# Patient Record
Sex: FEMALE | Employment: FULL TIME | URBAN - METROPOLITAN AREA
[De-identification: names, ages, dates, MRNs, and addresses within clinical notes are randomized per-mention and may not be internally consistent; named-entity substitution may affect disease eponyms.]

---

## 2020-11-24 VITALS
SYSTOLIC BLOOD PRESSURE: 100 MMHG | DIASTOLIC BLOOD PRESSURE: 68 MMHG | BODY MASS INDEX: 32.85 KG/M2 | WEIGHT: 174 LBS | HEIGHT: 61 IN

## 2020-11-24 PROBLEM — F41.9 ANXIETY: Status: ACTIVE | Noted: 2020-11-24

## 2020-11-24 PROBLEM — F32.A DEPRESSIVE DISORDER: Status: ACTIVE | Noted: 2020-11-24

## 2020-11-24 RX ORDER — NORELGESTROMIN AND ETHINYL ESTRADIOL 150; 35 UG/D; UG/D
1 PATCH TRANSDERMAL
COMMUNITY
End: 2020-11-30

## 2020-11-24 RX ORDER — NORGESTIMATE AND ETHINYL ESTRADIOL 0.25-0.035
1 KIT ORAL DAILY
COMMUNITY
End: 2020-11-30

## 2020-11-30 ENCOUNTER — OFFICE VISIT (OUTPATIENT)
Dept: OBGYN CLINIC | Age: 30
End: 2020-11-30
Payer: MEDICARE

## 2020-11-30 VITALS
BODY MASS INDEX: 32.66 KG/M2 | SYSTOLIC BLOOD PRESSURE: 108 MMHG | HEIGHT: 61 IN | WEIGHT: 173 LBS | DIASTOLIC BLOOD PRESSURE: 60 MMHG

## 2020-11-30 DIAGNOSIS — Z01.419 ENCOUNTER FOR GYNECOLOGICAL EXAMINATION WITHOUT ABNORMAL FINDING: Primary | ICD-10-CM

## 2020-11-30 DIAGNOSIS — Z12.39 ENCOUNTER FOR BREAST CANCER SCREENING USING NON-MAMMOGRAM MODALITY: ICD-10-CM

## 2020-11-30 DIAGNOSIS — Z11.3 VENEREAL DISEASE SCREENING: ICD-10-CM

## 2020-11-30 DIAGNOSIS — N94.6 DYSMENORRHEA: ICD-10-CM

## 2020-11-30 DIAGNOSIS — R10.31 RIGHT LOWER QUADRANT PAIN: ICD-10-CM

## 2020-11-30 PROBLEM — R51.9 CHRONIC HEADACHES: Status: ACTIVE | Noted: 2020-11-30

## 2020-11-30 PROBLEM — G89.29 CHRONIC HEADACHES: Status: ACTIVE | Noted: 2020-11-30

## 2020-11-30 PROCEDURE — 99395 PREV VISIT EST AGE 18-39: CPT | Performed by: OBSTETRICS & GYNECOLOGY

## 2020-11-30 PROCEDURE — G9717 DOC PT DX DEP/BP F/U NT REQ: HCPCS | Performed by: OBSTETRICS & GYNECOLOGY

## 2020-11-30 PROCEDURE — G8417 CALC BMI ABV UP PARAM F/U: HCPCS | Performed by: OBSTETRICS & GYNECOLOGY

## 2020-11-30 RX ORDER — ATENOLOL 25 MG/1
TABLET ORAL
COMMUNITY

## 2020-11-30 RX ORDER — ETONOGESTREL AND ETHINYL ESTRADIOL 11.7; 2.7 MG/1; MG/1
INSERT, EXTENDED RELEASE VAGINAL
Qty: 1 DEVICE | Refills: 11 | Status: SHIPPED | OUTPATIENT
Start: 2020-11-30 | End: 2021-12-06

## 2020-11-30 RX ORDER — BUSPIRONE HYDROCHLORIDE 5 MG/1
TABLET ORAL
COMMUNITY

## 2020-11-30 NOTE — PROGRESS NOTES
HPI: Jeffrey Adamson is a 35 y.o. female [address], LMP 11/9/2020, who presents today for the following:  Chief Complaint   Patient presents with   Hassel Titus Exam        She denies vaginal/vulvar pruritus; abnormal vaginal discharge or vaginal odor. She does have dysmenorrhea. She was considering IUD but is hesitant due to stories of uterine perforation. Last pap smear in May 2019 was neg. Past Medical History:   Diagnosis Date    Anxiety disorder     Chronic back pain     Chronic headaches     Depression     HPV (human papilloma virus) anogenital infection        History reviewed. No pertinent surgical history. Family History   Problem Relation Age of Onset    Diabetes Father     Breast Cancer Maternal Grandmother 67    Diabetes Maternal Grandmother     Diabetes Maternal Grandfather     Diabetes Paternal Grandmother     Uterine Cancer Neg Hx     Colon Cancer Neg Hx     Ovarian Cancer Neg Hx        Social History     Socioeconomic History    Marital status:      Spouse name: Not on file    Number of children: 0    Years of education: Not on file    Highest education level:  Bachelor's degree (e.g., BA, AB, BS)   Occupational History    Occupation:    Social Needs    Financial resource strain: Not on file    Food insecurity     Worry: Not on file     Inability: Not on file   Spyder Lynk needs     Medical: Not on file     Non-medical: Not on file   Tobacco Use    Smoking status: Current Every Day Smoker     Packs/day: 0.25     Types: Cigarettes    Smokeless tobacco: Never Used    Tobacco comment: 1 PPW   Substance and Sexual Activity    Alcohol use: Yes     Comment: Occasional    Drug use: Never    Sexual activity: Yes     Partners: Male     Birth control/protection: None     Comment: h/o HPV; ?gonorrhea   Lifestyle    Physical activity     Days per week: 5 days     Minutes per session: 60 min    Stress: Not on file   Relationships    Social connections Talks on phone: Not on file     Gets together: Not on file     Attends Gnosticism service: Not on file     Active member of club or organization: Not on file     Attends meetings of clubs or organizations: Not on file     Relationship status: Not on file    Intimate partner violence     Fear of current or ex partner: Not on file     Emotionally abused: Not on file     Physically abused: Not on file     Forced sexual activity: Not on file   Other Topics Concern    Not on file   Social History Narrative    Not on file         Review of Systems: Denies issues with eyes, ears, mouth, nose. Denies fevers/chills, significant weight loss/gain. Denies chest pain, shortness of breath, nausea, vomiting, constipation, diarrhea. Chronic RLQ, intermittent, x 3-4 yrs. Denies dysuria. Denies weakness, numbness or tingling. Chronic back pain and b/l shoulder pain. Denies issues with breasts. Denies bleeding/clotting d/o's. + Anxiety/depression. Denies S/HI. Feels safe at home. OBJECTIVE:  /60 (BP 1 Location: Right arm, BP Patient Position: Sitting)   Ht 5' 1\" (1.549 m)   Wt 173 lb (78.5 kg)   LMP 11/09/2020 (Approximate)   BMI 32.69 kg/m²      Constitutional  · Appearance: well-nourished, well developed, alert, in no acute distress    HENT  · Head and Face: appears normal    Neck  · Inspection/Palpation: normal appearance      Breasts   Symmetric, no palpable masses, no tenderness, no skin changes, no nipple abnormality, no nipple discharge, no axillary or supraclavicular lymphadenopathy.     Chest  · Respiratory Effort: normal      Gastrointestinal  · Abdominal Examination: abdomen non-tender to palpation, no masses present  · Liver and spleen: no hepatomegaly present, spleen not palpable      Genitourinary  · External Genitalia: normal appearance for age, no discharge present, no tenderness present, no inflammatory lesions present, no masses present, no atrophy present  · Vagina: normal vaginal vault without central or paravaginal defects, no discharge present, no inflammatory lesions present, no masses present  · Bladder: non-tender to palpation  · Urethra: appears normal  · Cervix: normal, no cervical motion tenderness or abnormal discharge; swab obtained  · Uterus: normal size, shape and consistency  · Adnexa: no adnexal tenderness present, no adnexal masses present  · Perineum: perineum within normal limits, no evidence of trauma, no rashes or skin lesions present  · Anus: anus within normal limits, no hemorrhoids present    Skin  · General Inspection: no rash, no lesions identified    Neurologic/Psychiatric  · Mental Status:  · Orientation: grossly oriented to person, place and time  · Mood and Affect: mood normal, affect appropriate      Assessment/plan:    ICD-10-CM ICD-9-CM    1. Encounter for gynecological examination without abnormal finding  Z01.419 V72.31    2. Dysmenorrhea  N94.6 625.3 ethinyl estradiol-etonogestrel (NUVARING) 0.12-0.015 mg/24 hr vaginal ring   3. Right lower quadrant pain  R10.31 789.03 US PELV NON OBS   4. Venereal disease screening  Z11.3 V74.5 CT/NG/T.VAGINALIS AMPLIFICATION   5. Encounter for breast cancer screening using non-mammogram modality  Z12.39 V76.10        -Annual gynecologic exam.    -Cervical cancer screening- pap smear neg May 2019. Due in 2022. -Breast cancer screening- breast awareness discussed; mammograms beginning at age 36.    -STI screening-accepts testing: G/C/T ordered. Declines bloodwork. -HPV vaccination- counseled. Has been vaccinated. -Dysmenorrhea- discussed management options and she would like to try Nuva Ring. Rx sent. Reviewed proper usage. -RLQ pain- pelvic us ordered.

## 2020-12-01 LAB
C TRACH RRNA SPEC QL NAA+PROBE: NEGATIVE
N GONORRHOEA RRNA SPEC QL NAA+PROBE: NEGATIVE
T VAGINALIS DNA SPEC QL NAA+PROBE: NEGATIVE

## 2020-12-09 NOTE — PROGRESS NOTES
PORTAL MESSAGE  Mark Braxton Comes, testing for gonorrhea, chlamydia, and trichomonas came back negative.

## 2021-01-27 ENCOUNTER — TELEPHONE (OUTPATIENT)
Dept: OBGYN CLINIC | Age: 31
End: 2021-01-27

## 2021-01-27 NOTE — TELEPHONE ENCOUNTER
I called the patient back and she requested more refills on her Nuvaring. I told patient that 1 ring was ordered on 11/30/2020 with 11 refills, which takes her through 1 year until her next annual.  I informed the patient to always call the pharmacy first to check on prescriptions ordered.

## 2022-03-18 PROBLEM — R10.31 RIGHT LOWER QUADRANT PAIN: Status: ACTIVE | Noted: 2020-11-30

## 2022-03-18 PROBLEM — F32.A DEPRESSIVE DISORDER: Status: ACTIVE | Noted: 2020-11-24

## 2022-03-18 PROBLEM — F41.9 ANXIETY: Status: ACTIVE | Noted: 2020-11-24

## 2022-03-19 PROBLEM — N94.6 DYSMENORRHEA: Status: ACTIVE | Noted: 2020-11-30

## 2022-03-19 PROBLEM — R51.9 CHRONIC HEADACHES: Status: ACTIVE | Noted: 2020-11-30

## 2022-03-19 PROBLEM — G89.29 CHRONIC HEADACHES: Status: ACTIVE | Noted: 2020-11-30

## 2022-04-08 ENCOUNTER — TRANSCRIBE ORDER (OUTPATIENT)
Dept: SCHEDULING | Age: 32
End: 2022-04-08

## 2022-04-08 DIAGNOSIS — R51.9 HEADACHE: Primary | ICD-10-CM

## 2022-04-27 ENCOUNTER — HOSPITAL ENCOUNTER (OUTPATIENT)
Dept: NEUROLOGY | Age: 32
Discharge: HOME | End: 2022-04-27
Attending: PSYCHIATRY & NEUROLOGY
Payer: COMMERCIAL

## 2022-04-27 DIAGNOSIS — R51.9 HEADACHE: ICD-10-CM

## 2022-04-27 PROCEDURE — 95816 EEG AWAKE AND DROWSY: CPT

## 2022-04-30 NOTE — PROCEDURES
[facility]  EEG    Name:  Sarah Rincon  MR#:  [id number]  :  1990  ACCOUNT #:  [de-identified]  DATE OF SERVICE:  2022      DIAGNOSIS:  Headache. DESCRIPTION:  Recording is done digitally on computer. Electrodes are placed in a 10-20 [employer] System. Initial recording is equipment calibration, followed by biocalibration. Initial montages are bipolar montages. TECHNIQUE:  Tracing is started with the patient awake, eyes closed, with well-formed bioccipital alpha rhythms in the 9 Hz frequency. As the recording continues, the patient is hooked up on EKG machine that shows a heart rate of 72. Tracing is continued with the patient being exposed to photic stimulation without any abnormality. Hyperventilation is not done. IMPRESSION:  This is a normal EEG, awake.       Mor Whittington MD      [id number]  D:  2022 16:01  T:  2022 2:14  JOB #:  [id number]

## 2023-05-21 RX ORDER — ETONOGESTREL AND ETHINYL ESTRADIOL 11.7; 2.7 MG/1; MG/1
INSERT, EXTENDED RELEASE VAGINAL
COMMUNITY
Start: 2021-12-06

## 2023-05-21 RX ORDER — BUSPIRONE HYDROCHLORIDE 5 MG/1
TABLET ORAL
COMMUNITY

## 2023-05-21 RX ORDER — ATENOLOL 25 MG/1
TABLET ORAL
COMMUNITY